# Patient Record
Sex: MALE | Race: BLACK OR AFRICAN AMERICAN
[De-identification: names, ages, dates, MRNs, and addresses within clinical notes are randomized per-mention and may not be internally consistent; named-entity substitution may affect disease eponyms.]

---

## 2022-07-08 ENCOUNTER — HOSPITAL ENCOUNTER (INPATIENT)
Dept: HOSPITAL 97 - ER | Age: 57
LOS: 2 days | Discharge: HOME | DRG: 302 | End: 2022-07-10
Attending: INTERNAL MEDICINE | Admitting: INTERNAL MEDICINE
Payer: COMMERCIAL

## 2022-07-08 VITALS — OXYGEN SATURATION: 99 %

## 2022-07-08 VITALS — BODY MASS INDEX: 35.3 KG/M2

## 2022-07-08 DIAGNOSIS — N17.9: ICD-10-CM

## 2022-07-08 DIAGNOSIS — U07.1: ICD-10-CM

## 2022-07-08 DIAGNOSIS — E87.5: ICD-10-CM

## 2022-07-08 DIAGNOSIS — E11.65: ICD-10-CM

## 2022-07-08 DIAGNOSIS — I50.9: ICD-10-CM

## 2022-07-08 DIAGNOSIS — I13.0: ICD-10-CM

## 2022-07-08 DIAGNOSIS — N40.0: ICD-10-CM

## 2022-07-08 DIAGNOSIS — E11.22: ICD-10-CM

## 2022-07-08 DIAGNOSIS — N18.4: ICD-10-CM

## 2022-07-08 DIAGNOSIS — I25.119: Primary | ICD-10-CM

## 2022-07-08 DIAGNOSIS — E78.5: ICD-10-CM

## 2022-07-08 LAB
BUN BLD-MCNC: 46 MG/DL (ref 7–18)
GLUCOSE SERPLBLD-MCNC: 132 MG/DL (ref 74–106)
HCT VFR BLD CALC: 35.8 % (ref 39.6–49)
LYMPHOCYTES # SPEC AUTO: 1.2 K/UL (ref 0.7–4.9)
MCV RBC: 90.7 FL (ref 80–100)
NT-PROBNP SERPL-MCNC: 83 PG/ML (ref ?–125)
PMV BLD: 8.5 FL (ref 7.6–11.3)
POTASSIUM SERPL-SCNC: 5.4 MMOL/L (ref 3.5–5.1)
RBC # BLD: 3.94 M/UL (ref 4.33–5.43)
TROPONIN I SERPL HS-MCNC: 25.6 PG/ML (ref ?–58.9)

## 2022-07-08 PROCEDURE — 76770 US EXAM ABDO BACK WALL COMP: CPT

## 2022-07-08 PROCEDURE — 81003 URINALYSIS AUTO W/O SCOPE: CPT

## 2022-07-08 PROCEDURE — 36415 COLL VENOUS BLD VENIPUNCTURE: CPT

## 2022-07-08 PROCEDURE — 80053 COMPREHEN METABOLIC PANEL: CPT

## 2022-07-08 PROCEDURE — 71045 X-RAY EXAM CHEST 1 VIEW: CPT

## 2022-07-08 PROCEDURE — 81015 MICROSCOPIC EXAM OF URINE: CPT

## 2022-07-08 PROCEDURE — 85025 COMPLETE CBC W/AUTO DIFF WBC: CPT

## 2022-07-08 PROCEDURE — 80048 BASIC METABOLIC PNL TOTAL CA: CPT

## 2022-07-08 PROCEDURE — 84484 ASSAY OF TROPONIN QUANT: CPT

## 2022-07-08 PROCEDURE — 76857 US EXAM PELVIC LIMITED: CPT

## 2022-07-08 PROCEDURE — 82947 ASSAY GLUCOSE BLOOD QUANT: CPT

## 2022-07-08 PROCEDURE — 99285 EMERGENCY DEPT VISIT HI MDM: CPT

## 2022-07-08 PROCEDURE — 83880 ASSAY OF NATRIURETIC PEPTIDE: CPT

## 2022-07-08 PROCEDURE — 82043 UR ALBUMIN QUANTITATIVE: CPT

## 2022-07-08 PROCEDURE — 85379 FIBRIN DEGRADATION QUANT: CPT

## 2022-07-08 PROCEDURE — 82570 ASSAY OF URINE CREATININE: CPT

## 2022-07-08 PROCEDURE — 93005 ELECTROCARDIOGRAM TRACING: CPT

## 2022-07-08 RX ADMIN — HUMAN INSULIN SCH: 100 INJECTION, SOLUTION SUBCUTANEOUS at 22:04

## 2022-07-08 RX ADMIN — Medication SCH ML: at 23:13

## 2022-07-08 RX ADMIN — HEPARIN SODIUM SCH UNIT: 5000 INJECTION, SOLUTION INTRAVENOUS; SUBCUTANEOUS at 23:08

## 2022-07-08 RX ADMIN — ATORVASTATIN CALCIUM SCH: 10 TABLET, FILM COATED ORAL at 22:04

## 2022-07-08 RX ADMIN — LABETALOL HYDROCHLORIDE SCH: 100 TABLET, FILM COATED ORAL at 22:04

## 2022-07-08 RX ADMIN — TAMSULOSIN HYDROCHLORIDE SCH: 0.4 CAPSULE ORAL at 22:04

## 2022-07-08 RX ADMIN — HYDRALAZINE HYDROCHLORIDE SCH: 25 TABLET, FILM COATED ORAL at 22:04

## 2022-07-08 NOTE — RAD REPORT
EXAM DESCRIPTION:  US - Urinary Bladder - 7/8/2022 9:17 pm

 

CLINICAL HISTORY:  Bladder outlet obstruction

 

FINDINGS:  Bladder volume 416 cc.

 

Prostate gland is moderately to markedly enlarged.

 

No ascites

 

IMPRESSION:  Moderate to marked enlargement of the prostate gland.

 

Bladder is distended

## 2022-07-08 NOTE — RAD REPORT
EXAM DESCRIPTION:  [place] Single View7/8/2022 4:28 pm

 

CLINICAL HISTORY:  Chest pain

 

COMPARISON:  2012

 

FINDINGS:   The lungs appear clear of acute infiltrate. The heart is normal size

 

IMPRESSION:   No acute abnormalities displayed

## 2022-07-08 NOTE — EDPHYS
Physician Documentation                                                                           

 [facility]                                                                 

Name: Gerry Liang                                                                                

Age: 57 yrs                                                                                       

Sex: Male                                                                                         

: 1965                                                                                   

MRN: [id number]                                                                                   

Arrival Date: 2022                                                                          

Time: 15:45                                                                                       

Account#: [id number]                                                                            

Bed 24                                                                                            

Private MD:                                                                                       

ED Physician Geovany Randle                                                                         

HPI:                                                                                              

                                                                                             

16:33 This 57 yrs old Black Male presents to ER via Ambulatory with complaints of Shortness   ms3 

      Of Breath - on exertion.                                                                    

16:34 The patient or guardian reports chest pain that is located primarily in the substernal  ms3 

      area. Onset: 1 week(s) ago. The pain does not radiate. Associated signs and symptoms:       

      Pertinent positives: Pertinent negatives: abdominal pain, diaphoresis, headache,            

      vomiting. The chest pain is described as "discomfort". Duration: The patient or             

      guardian reports a single episode. Modifying factors: The symptoms are alleviated by        

      rest, the symptoms are aggravated by exertion. Severity of pain: At its worst the pain      

      was incapacitating in the emergency department the pain has resolved.                       

                                                                                                  

Historical:                                                                                       

- Allergies:                                                                                      

16:06 unknow med;                                                                             jl7 

- PMHx:                                                                                           

16:06 Diabetes mellitus; Hypertensive disorder; Hypercholesterolemia; Congestive heart        jl7 

      failure;                                                                                    

                                                                                                  

- Immunization history:: Client reports receiving the 2nd dose of the Covid vaccine.              

- Social history:: Smoking status: Patient denies any tobacco usage or history of.                

                                                                                                  

                                                                                                  

ROS:                                                                                              

16:34 Constitutional: Negative for fever, and chills. Neck: Negative for injury, pain, and    ms3 

      swelling.                                                                                   

16:34 Respiratory: Negative for shortness of breath, cough, wheezing, and pleuritic chest         

      pain, Abdomen/GI: Negative for abdominal pain, nausea, vomiting, diarrhea, and              

      constipation, MS/Extremity: Negative for injury and deformity, Skin: Negative for           

      injury, rash, and discoloration, Neuro: Negative for headache, weakness, numbness,          

      tingling.                                                                                   

16:34 Cardiovascular: Positive for chest pain.                                                    

16:34 All other systems are negative.                                                             

                                                                                                  

Exam:                                                                                             

16:09 ECG was reviewed by the Attending Physician.                                            ms3 

16:34 Constitutional:  This is a well developed, well nourished patient who is awake, alert,  ms3 

      and in no acute distress. Head/Face:  Normocephalic, atraumatic. Neck:  Trachea             

      midline, no cervical lymphadenopathy.  Supple, full range of motion without nuchal          

      rigidity, or vertebral point tenderness.  No Meningismus. Chest/axilla:  Normal chest       

      wall appearance and motion.  Nontender with no deformity.   Cardiovascular:  Regular        

      rate and rhythm with a normal S1 and S2.  No gallops, murmurs, or rubs.  Normal PMI, no     

      JVD.  No pulse deficits. Respiratory:  Lungs have equal breath sounds bilaterally,          

      clear to auscultation and percussion.  No rales, rhonchi or wheezes noted.  No              

      increased work of breathing, no retractions or nasal flaring. Abdomen/GI:  Soft,            

      non-tender, with normal bowel sounds.  No distension or tympany.  No guarding or            

      rebound.  No evidence of tenderness throughout. Skin:  Warm, dry with normal turgor.        

      Normal color with no rashes, no lesions, and no evidence of cellulitis. MS/ Extremity:      

      Pulses equal, no cyanosis.  Neurovascular intact.  Full, normal range of motion. Neuro:     

       Awake and alert, GCS 15, oriented to person, place, time, and situation.  Cranial          

      nerves II-XII grossly intact.  Motor strength 5/5 in all extremities.  Sensory grossly      

      intact.  Cerebellar exam normal.  Normal gait. Psych:  Awake, alert, with orientation       

      to person, place and time.  Behavior, mood, and affect are within normal limits.            

                                                                                                  

Vital Signs:                                                                                      

16:05  / 74; Pulse 73; Resp 20; Temp 97.7; Pulse Ox 98% ; Weight 111.13 kg; Height 5    7 

      ft. 10 in. (177.80 cm); Pain 0/10;                                                          

16:21  / 89; Pulse 73; Resp 18; Pulse Ox 98% on R/A;                                    bh1 

17:46  / 78; Pulse 75; Resp 18; Pulse Ox 97% on R/A;                                    bh1 

20:02  / 61; Pulse 69; Resp 18; Pulse Ox 99% on R/A;                                    bh1 

16:05 Body Mass Index 35.15 (111.13 kg, 177.80 cm)                                            [facility] 

                                                                                                  

MDM:                                                                                              

16:03 Patient medically screened.                                                             ms3 

16:35 Differential diagnosis: abnormal EKG, acute myocardial infarction, stable angina,       ms3 

      unstable angina.                                                                            

17:13 HEART Score: History: Moderately Suspicious (1), ECG: Normal (0), Age: > 45 and < 65    ms3 

      years (1), Risk Factors: > or = 3 Risk factors for atherosclerotic disease (2),             

      [Hypercholesterolemia] [Hypertension] [DM] Troponin: < or = 1 x Normal Limit (0), Total     

      Score = 4. Data reviewed: vital signs, nurses notes, lab test result(s), EKG,               

      radiologic studies, and as a result, I will admit patient. Counseling: I had a detailed     

      discussion with the patient and/or guardian regarding: the historical points, exam          

      findings, and any diagnostic results supporting the discharge/admit diagnosis, lab          

      results, radiology results, the need for further work-up and treatment in the hospital.     

      ED course: Discussed plan for observation with patient and he agrees with plan. patient     

      remained in stable condition in the ED..                                                    

                                                                                                  

                                                                                             

16:04 Order name: Basic Metabolic Panel; Complete Time: 17:05                                 ms3 

                                                                                             

16:04 Order name: CBC with Diff; Complete Time: 17:05                                         ms3 

                                                                                             

16:04 Order name: NT PRO-BNP; Complete Time: 17:05                                            ms3 

                                                                                             

16:04 Order name: Troponin HS; Complete Time: 17:05                                           ms3 

                                                                                             

16:04 Order name: XRAY Chest (1 view); Complete Time: 17:05                                   ms3 

                                                                                             

16:33 Order name: COVID-19 SARS RT PCR (Document "Date of Onset" if Symptomatic)              jl7 

                                                                                             

16:04 Order name: EKG; Complete Time: 16:04                                                   ms3 

                                                                                             

16:04 Order name: Cardiac monitoring; Complete Time: 16:21                                    ms3 

                                                                                             

16:04 Order name: EKG - Nurse/Tech; Complete Time: 16:21                                      ms3 

                                                                                             

16:04 Order name: IV Saline Lock; Complete Time: 16:21                                        ms3 

                                                                                             

16:04 Order name: Labs collected and sent; Complete Time: 16:21                               ms3 

                                                                                             

16:04 Order name: O2 Per Protocol; Complete Time: 16:21                                       ms3 

                                                                                             

21:37 Order name:                                                                           EDMS

                                                                                             

16:04 Order name: O2 Sat Monitoring; Complete Time: 16:21                                     ms3 

                                                                                                  

EC:09 Rate is 74 beats/min. Rhythm is regular. Left axis deviation noted. MI interval is      ms3 

      normal. QRS interval is normal. Clinical impression: NSR w/ Non-specific ST/T Changes.      

      Interpreted by me. Reviewed by me.                                                          

                                                                                                  

Administered Medications:                                                                         

16:25 Drug: Aspirin Chewable Tablet 324 mg Route: PO;                                         [facility] 

16:25 Follow up: Response: No adverse reaction                                                [facility] 

                                                                                                  

                                                                                                  

Disposition Summary:                                                                              

22 17:12                                                                                    

Hospitalization Ordered                                                                           

      Location: Telemetry/MedSurg (observation)                                               ms3 

      Condition: Stable                                                                       ms3 

      Problem: new                                                                            ms3 

      Symptoms: are unchanged                                                                 ms3 

      Bed/Room Type: Standard                                                                 ms3 

      Hospitalization Status: Inpatient Admission(22 17:51)                             [facility] 

      Provider: Fercho Garnica(22 17:51)                                                 elisha 

      Room Assignment: [facility](22 21:02)                                                      

      Diagnosis                                                                                   

        - Chest pain, unspecified                                                             ms3 

      Forms:                                                                                      

        - Medication Reconciliation Form                                                      ms3 

        - SBAR form                                                                           ms3 

Signatures:                                                                                       

Dispatcher [employer]                           EDMS                                                 

Perez Guevara, AGUILA-C                      ANISHP-Cla1                                                  

Lucero Ngo RN                       RN                                                      

Andres George RN                        RN   jl7                                                  

Geovany Randle DO                        DO   ms3                                                  

Gina Clarke RN                      RN   [facility]                                                  

                                                                                                  

Corrections: (The following items were deleted from the chart)                                    

16:07 16:06 PMHx: None; akil barnard 

17:51 17:12 Observation ms3                                                                   la1 

17:51 17:12 Luis A Agustin ms3                                                              la1 

21:02 17:12 ms3                                                                               cg  

                                                                                                  

**************************************************************************************************

## 2022-07-08 NOTE — P.PN
Date of Service: 07/08/22





Brief Renal note (chart review only, full consult note to follow):


Pt sent from the office earlier today due to reports of worsening AGUILERA. Pt's 

renal function tests on admission reveal Stage III ARF (Cr > 4 mg/dl) on his 

underlying CKD IIIb/IV (Cr levels earlier in the year ranged between 2.5-2.9 

mg/dl). Pt previously taken off agents such as ARB and SGLT2i. Pt has been on 

chronic diuretic therapy (loop and aldosterone antagonist) for unspecified CHF 

and BNP here < 100 despite renal impairment and CXR clear so can not exclude a 

pre-renal component. 


Pt's symptoms appear to be possibly COVID-19 infection related as he has tested 

positive.


Pt also has some hx of bladder outlet obstruction so will check random bladder 

scan. Will dose Kayexalate 15 gm x 1 for mild hyperkalemia. Will suspend 

diuretics. Will hydrate gently.





Thank you for the referral,


Keshav Ibarra MD, [facility]


[facility]

## 2022-07-08 NOTE — ER
Nurse's Notes                                                                                     

 [facility]                                                                 

Name: Gerry Liang                                                                                

Age: 57 yrs                                                                                       

Sex: Male                                                                                         

: 1965                                                                                   

MRN: [id number]                                                                                   

Arrival Date: 2022                                                                          

Time: 15:45                                                                                       

Account#: [id number]                                                                            

Bed 24                                                                                            

Private MD:                                                                                       

Diagnosis: Chest pain, unspecified                                                                

                                                                                                  

Presentation:                                                                                     

                                                                                             

16:05 Chief complaint: Patient states: Chest discomfort with exertion x 1 week. Coronavirus   jl7 

      screen: At this time, the client does not indicate any symptoms associated with             

      coronavirus-19. Ebola Screen: No symptoms or risks identified at this time. Initial         

      Sepsis Screen: Does the patient meet any 2 criteria? No. Patient's initial sepsis           

      screen is negative. Does the patient have a suspected source of infection? No.              

      Patient's initial sepsis screen is negative. Risk Assessment: Do you want to hurt           

      yourself or someone else? Patient reports no desire to harm self or others. Onset of        

      symptoms was 2022.                                                                  

16:05 Method Of Arrival: Ambulatory                                                           jl7 

16:05 Acuity: DAMIAN 2                                                                           jl7 

                                                                                                  

Triage Assessment:                                                                                

16:06 General: Appears in no apparent distress. uncomfortable, Behavior is calm, cooperative, jl7 

      appropriate for age. Pain: Complains of pain in chest Pain currently is 0 out of 10 on      

      a pain scale. at worst was 10 out of 10 on a pain scale. Respiratory: Reports shortness     

      of breath on exertion Onset: The symptoms/episode began/occurred gradually, the patient     

      reports symptoms have resolved.                                                             

                                                                                                  

Historical:                                                                                       

- Allergies:                                                                                      

16:06 unknow med;                                                                             jl7 

- PMHx:                                                                                           

16:06 Diabetes mellitus; Hypertensive disorder; Hypercholesterolemia; Congestive heart        jl7 

      failure;                                                                                    

                                                                                                  

- Immunization history:: Client reports receiving the 2nd dose of the Covid vaccine.              

- Social history:: Smoking status: Patient denies any tobacco usage or history of.                

                                                                                                  

                                                                                                  

Screenin:21 Abuse screen: Denies threats or abuse. Nutritional screening: No deficits noted.        bh1 

      Tuberculosis screening: No symptoms or risk factors identified. Fall Risk None              

      identified.                                                                                 

                                                                                                  

Assessment:                                                                                       

16:21 Reassessment: No changes from previously documented assessment. General: Appears in no  bh1 

      apparent distress. Cardiovascular: No deficits noted. Rhythm is regular. Respiratory:       

      Airway is patent Respiratory effort is even, unlabored, Breath sounds are clear             

      bilaterally.                                                                                

                                                                                                  

Vital Signs:                                                                                      

16:05  / 74; Pulse 73; Resp 20; Temp 97.7; Pulse Ox 98% ; Weight 111.13 kg; Height 5    7 

      ft. 10 in. (177.80 cm); Pain 0/10;                                                          

16:21  / 89; Pulse 73; Resp 18; Pulse Ox 98% on R/A;                                    bh1 

17:46  / 78; Pulse 75; Resp 18; Pulse Ox 97% on R/A;                                    bh1 

20:02  / 61; Pulse 69; Resp 18; Pulse Ox 99% on R/A;                                    bh1 

16:05 Body Mass Index 35.15 (111.13 kg, 177.80 cm)                                            7 

                                                                                                  

ED Course:                                                                                        

15:45 Patient arrived in ED.                                                                  as  

15:46 Geovany Randle DO is Attending Physician.                                                ms3 

16:02 Gina Clarke, RN is Primary Nurse.                                                    1 

16:06 Triage completed.                                                                       7 

16:06 Arm band placed on right wrist.                                                         jl7 

16:21 No apparent distress. Awaiting lab results, Awaiting radiology results.                 1 

16:21 Patient has correct armband on for positive identification. Bed in low position. Call   [facility] 

      light in reach. Side rails up X 1. Cardiac monitor on. Pulse ox on. NIBP on.                

16:21 Basic Metabolic Panel Sent.                                                             [facility] 

16:21 CBC with Diff Sent.                                                                     1 

16:21 NT PRO-BNP Sent.                                                                        1 

16:21 Troponin HS Sent.                                                                       1 

16:21 No provider procedures requiring assistance completed. Inserted saline lock: 20 gauge   bh1 

      in left forearm, using aseptic technique. Blood collected.                                  

16:30 XRAY Chest (1 view) In Process Unspecified.                                             [facility]

17:11 Luis A Agustin MD is Hospitalizing Provider.                                         ms3 

17:46 No apparent distress. Resting quietly. Awaiting bed assignment, Awaiting lab results,   [facility] 

      Awaiting radiology results. Patient requests food.                                          

17:46 COVID-19 SARS RT PCR (Document "Date of Onset" if Symptomatic) Sent.                    [facility] 

17:51 Fercho Garnica MD is Hospitalizing Provider.                                            la1 

20:04 No apparent distress. Resting quietly. Awaiting bed assignment.                         [facility] 

21:16 Patient admitted, IV remains in place.                                                  1 

                                                                                                  

Administered Medications:                                                                         

16:25 Drug: Aspirin Chewable Tablet 324 mg Route: PO;                                         [facility] 

16:25 Follow up: Response: No adverse reaction                                                [facility] 

                                                                                                  

                                                                                                  

Medication:                                                                                       

16:21 VIS not applicable for this client.                                                     [facility] 

                                                                                                  

Outcome:                                                                                          

17:12 Decision to Hospitalize by Provider.                                                    ms3 

21:15 Admitted to Tele accompanied by tech, room 427, Report called to  GUERA LICONA               [facility] 

21:15 Condition: good                                                                             

21:15 Discharge instructions given to caretaker.                                                  

22:24 Patient left the ED.                                                                    [facility] 

                                                                                                  

Signatures:                                                                                       

Dispatcher [employer]                           EDSisi Zapata Lee, FNP-C                      FNP-Cla1                                                  

Andres George RN                        RN   jl7                                                  

Geovany Randle DO                        DO   ms3                                                  

Gina Clarke RN                      RN   [facility]                                                  

                                                                                                  

Corrections: (The following items were deleted from the chart)                                    

16:07 16:06 PMHx: None; akil barnard 

21:41 21:15 Admitted to Tele accompanied by tech, room 427, [place] [id number] 

                                                                                                  

**************************************************************************************************

## 2022-07-08 NOTE — P.HP
Certification for Inpatient


Patient admitted to: Inpatient


With expected LOS: >2 Midnights


Patient will require the following post-hospital care: None


Practitioner: I am a practitioner with admitting privileges, knowledge of 

patient current condition, hospital course, and medical plan of care.


Services: Services provided to patient in accordance with Admission requirements

found in Title 42 Section 412.3 of the Code of Federal Regulations





<Perez Guevara - Last Filed: 22 18:41>





Patient History


Date of Service: 22


Reason for admission: Chest pain


History of Present Illness: 


57-year-old male with history of insulin-dependent diabetes, hypertension, 

hyperlipidemia, CHFunknown EF, CAD presents to the emergency department for 

chest pain.  Patient reports that he was having to move and heavy object which 

is atypical for him in the process of moving heavy objects he developed an 

uncomfortable sensation in his chest with severe fatigue causing him to need to 

stop this activity, he rested and it was relieved, he attempted again to move a 

heavy object and once again experienced an abnormal sensation in his chest which

she cannot further describe along with fatigue.  He discontinued his efforts and

presented to the emergency department for evaluation his labs were remarkable 

for creatinine of 4.03 GFR of 16 BUN of 46 and potassium of 5.4 his initial 

high-sensitivity troponin was within normal limits at 25.6, his BNP was 83 EKG 

was without STEMI criteria chest x-ray showed no acute abnormalities.  His last 

creatinine here was from  and was normal at 1.27 although patient reports he

does follow with nephrology on outpatient basis and has been told he has chronic

kidney disease, he cannot tell me what his baseline renal function is although 

he reports that in the past his nephrologist has told him of his kidney function

got much worse he could require dialysis.  I called patient's nephrology group, 

they will consult.  ED provider wishes to admit for ACS rule out.








- Past Medical/Surgical History


-: Diabetes metas type IIinsulin-dependent


-: Hypertension


-: BPH


-: Hyperlipidemia


-: CKD


-: CHFunknown EF


-: Right foot surgery


-: Heart cath around  no stentssome disease


Psychosocial/ Personal History: Patient lives at home, is retired





- Family History


  ** Mother


-: Heart disease


Notes:  at 69 from [place]





  ** Brother


-: Heart disease


Notes:  at 56 from [place]





- Social History


Smoking Status: Never smoker


Alcohol use: No


CD- Drugs: No


Caffeine use: Yes


Place of Residence: Home





<Perez Guevara - Last Filed: 22 18:41>


Date of Service: 22





<Fercho Garnica - Last Filed: 22 16:27>





Review of Systems


10-point ROS is otherwise unremarkable


Respiratory: SOB with Excertion


Cardiovascular: Chest Pain





<Perez Guevara - Last Filed: 22 18:41>





Physical Examination





- Physical Exam


General: Alert, In no apparent distress, Oriented x3, Obese


HEENT: Atraumatic, PERRLA, Mucous membr. moist/pink, EOMI, Sclerae nonicteric


Neck: Supple, 2+ carotid pulse no bruit, No LAD, Without JVD or thyroid 

abnormality


Respiratory: Clear to auscultation bilaterally, Normal air movement


Cardiovascular: Regular rate/rhythm, Normal S1 S2


Gastrointestinal: Normal bowel sounds, No tenderness


Musculoskeletal: No tenderness


Integumentary: No rashes


Neurological: Normal speech, Normal strength at 5/5 x4 extr, Normal tone, Normal

affect





- Studies


Laboratory Data (last 24 hrs)





22 16:18: WBC 6.2, Hgb 12.0 L, Hct 35.8 L, Plt Count 236


22 16:18: Sodium 136, Potassium 5.4 H, BUN 46 H, Creatinine 4.03 H, 

Glucose 132 H








<Perez Guevara - Last Filed: 22 18:41>





- Studies


Laboratory Data (last 24 hrs)





22 16:18: WBC 6.2, Hgb 12.0 L, Hct 35.8 L, Plt Count 236


22 16:18: Sodium 136, Potassium 5.4 H, BUN 46 H, Creatinine 4.03 H, 

Glucose 132 H








<Fercho Garinca - Last Filed: 22 16:27>





Assessment and Plan





- Plan


Assessment:


Chest pain rule out ACS


Acute renal failure versus CKD 4


Chronic CHFunknown EF


Hypertension


Hyperlipidemia


BPH





Plan:


Chest pain rule out ACS: Patient reports last heart catheterization was around 5

years ago did not require any stenting but was told he did have some coronary 

artery disease, continue patient's aspirin, statin we will add low-dose beta-

blocker.  Story sounds like new onset angina, symptoms were quickly relieved 

after discontinuation of strenuous activity.  Monitor on telemetry, trend 

troponins, cardiology consult in place.  Continue other home medications.


Acute renal failure versus CKD 4: Unknown baseline renal function, patient 

follows with Dr. Plummer, has consulted nephrology for additional input, 

patient may be in acute renal failure renal ultrasound has been ordered and 

nephrology consult is in place.  Patient on renal/ADA diet.


Chronic CHFunknown EF: No echo available for review patient reports that he had

echocardiogram done in the last year or 2 with his cardiologist Dr. Mendenhall.  He is 

not sure what his ejection fraction is he does not appear to be volume 

overloaded at this time.  We will continue patient's home medications and 

monitor on telemetry.  Appreciate further input from cardiology.


Hypertension: Home medications have been continued with hold parameters with a

ddition of low-dose beta-blocker


Hyperlipidemia: Continue statin


BPH: Flomax continued.





DVT PPX: Heparin


Code status: Full


Discharge Plan: Home


Plan to discharge in: 48 Hours





- Advance Directives


Does patient have a Living Will: No


Does patient have a Durable POA for Healthcare: No





- Code Status/Comfort Care


Code Status Assessed: Yes (Full code)


Critical Care: No


Time Spent Managing Pts Care (In Minutes): 70





<Perez Guevara - Last Filed: 22 18:41>


Physician Review Additional Text: 


I have personally seen and evaluated Mr. Gerry Liang. I reviewed the notes and 

assessments performed by Perez Guevara NP. I independently performed my own 

history and physical examination. I agree with the assessment and plan as 

outlined in his note. I concur with his documentation of Mr. Liang.





<Fercho Garnica - Last Filed: 22 16:27>

## 2022-07-09 LAB
ALBUMIN SERPL BCP-MCNC: 3.7 G/DL (ref 3.4–5)
ALP SERPL-CCNC: 55 U/L (ref 45–117)
ALT SERPL W P-5'-P-CCNC: 61 U/L (ref 12–78)
AST SERPL W P-5'-P-CCNC: 47 U/L (ref 15–37)
BUN BLD-MCNC: 50 MG/DL (ref 7–18)
CREAT UR-SCNC: 97 MG/DL (ref 20–370)
GLUCOSE SERPLBLD-MCNC: 177 MG/DL (ref 74–106)
HCT VFR BLD CALC: 34.1 % (ref 39.6–49)
LYMPHOCYTES # SPEC AUTO: 1.7 K/UL (ref 0.7–4.9)
MCV RBC: 90.5 FL (ref 80–100)
MICROALBUMIN UR-MCNC: < 0.5 MG/DL (ref ?–1.9)
PMV BLD: 9.3 FL (ref 7.6–11.3)
POTASSIUM SERPL-SCNC: 4.4 MMOL/L (ref 3.5–5.1)
RBC # BLD: 3.77 M/UL (ref 4.33–5.43)

## 2022-07-09 RX ADMIN — Medication SCH ML: at 09:00

## 2022-07-09 RX ADMIN — HYDRALAZINE HYDROCHLORIDE SCH: 25 TABLET, FILM COATED ORAL at 13:00

## 2022-07-09 RX ADMIN — LABETALOL HYDROCHLORIDE SCH MG: 100 TABLET, FILM COATED ORAL at 21:19

## 2022-07-09 RX ADMIN — INSULIN GLARGINE SCH UNIT: 100 INJECTION, SOLUTION SUBCUTANEOUS at 10:04

## 2022-07-09 RX ADMIN — HUMAN INSULIN SCH UNIT: 100 INJECTION, SOLUTION SUBCUTANEOUS at 17:50

## 2022-07-09 RX ADMIN — ATORVASTATIN CALCIUM SCH MG: 10 TABLET, FILM COATED ORAL at 21:19

## 2022-07-09 RX ADMIN — HYDRALAZINE HYDROCHLORIDE SCH: 25 TABLET, FILM COATED ORAL at 10:06

## 2022-07-09 RX ADMIN — Medication SCH ML: at 21:20

## 2022-07-09 RX ADMIN — ASPIRIN SCH MG: 81 TABLET, COATED ORAL at 10:06

## 2022-07-09 RX ADMIN — HEPARIN SODIUM SCH: 5000 INJECTION, SOLUTION INTRAVENOUS; SUBCUTANEOUS at 10:07

## 2022-07-09 RX ADMIN — HYDRALAZINE HYDROCHLORIDE SCH MG: 25 TABLET, FILM COATED ORAL at 21:19

## 2022-07-09 RX ADMIN — TAMSULOSIN HYDROCHLORIDE SCH MG: 0.4 CAPSULE ORAL at 21:19

## 2022-07-09 RX ADMIN — HUMAN INSULIN SCH UNIT: 100 INJECTION, SOLUTION SUBCUTANEOUS at 12:38

## 2022-07-09 RX ADMIN — HUMAN INSULIN SCH: 100 INJECTION, SOLUTION SUBCUTANEOUS at 07:30

## 2022-07-09 RX ADMIN — HEPARIN SODIUM SCH UNIT: 5000 INJECTION, SOLUTION INTRAVENOUS; SUBCUTANEOUS at 22:11

## 2022-07-09 RX ADMIN — HUMAN INSULIN SCH UNIT: 100 INJECTION, SOLUTION SUBCUTANEOUS at 22:22

## 2022-07-09 RX ADMIN — LABETALOL HYDROCHLORIDE SCH: 100 TABLET, FILM COATED ORAL at 10:06

## 2022-07-09 NOTE — P.CNS
Date of Consult: 22


Reason for Consult: Renal insufficiency


Primary Care Provider: Perez


Chief Complaint: Chest pain


History of Present Illness: 





Pt is a 57-year-old male AAM with history of uncontrolled insulin-dependent 

diabetes, hypertension, hyperlipidemia, CHF who was referred to the ER by the NP

working with Dr. Plummer his Nephrologist based on pt reports of increased 

dyspnea and fatigue with exertion. He denies current CP at rest. He has not been

out of bed today. He denies dyspnea at rest and is not on O2. He is afebrile, he

did test positive for COVID-19. His renal function tests on admission were worse

c/w his baseline function. He has been urinating without issues he reports. 

Renal u/s done which was reviewed.


Allergies





No Known Allergies Allergy (Unverified 22 22:01)


   





Home Medications: 








Amlodipine [Norvasc*] 5 mg PO DAILY 22 


Aspirin [Aspirin EC 81 MG] 81 mg PO DAILY 22 


Bumetanide [Bumex*] 1 mg PO DAILY 22 


Doxazosin Mesylate [Cardura] 2 mg PO BEDTIME 22 


Famotidine 20 mg PO BID 22 


Hydralazine [Apresoline*] 25 mg PO TID 22 


Labetalol HCl [Trandate] 200 mg PO BID 22 


Simvastatin 20 mg PO BEDTIME 22 


Spironolactone [Aldactone] 50 mg PO BID 22 


Tadalafil [Cialis] 5 mg PO DAILY 22 


Tamsulosin [Flomax*] 1 mg PO DAILY 22 








- Past Medical/Surgical History


Diabetic: Yes


-: Diabetes metas type IIinsulin-dependent


-: Hypertension


-: BPH


-: Hyperlipidemia


-: CKD


-: CHFunknown EF


-: Right foot surgery from infection


-: Heart cath around  no stentssome disease


Psychosocial/ Personal History: Patient lives at home, is retired





- Family History


  ** Mother


Medical History: Heart disease


Notes:  at 69 from [place]





  ** Brother


Medical History: Heart disease


Notes:  at 56 from [place]





- Social History


Alcohol use: No


CD- Drugs: No


Caffeine use: No


Place of Residence: Home





Review of Systems


General: Weakness


Eyes: Unremarkable


ENT: Unremarkable


Respiratory: SOB with Excertion


Cardiovascular: Chest Pain


Gastrointestinal: Unremarkable


Genitourinary: Other (Hx of BPH)


Musculoskeletal: Unremarkable


Integumentary: Unremarkable


Neurological: Unremarkable


Lymphatics: Unremarkable





Physical Examination














Temp Pulse Resp BP Pulse Ox


 


 97.6 F   71   18   108/71   98 


 


 22 16:27  22 16:27  22 16:27  22 16:27  22 16:27








General: Alert, In no apparent distress, Oriented x3


HEENT: Atraumatic


Neck: Supple


Respiratory: Normal air movement


Cardiovascular: No edema, Regular rate/rhythm


Gastrointestinal: Soft and benign


Integumentary: No rashes


Neurological: Normal speech, Normal affect (Limited exam due to COVID-19 

isolation precautions)


Laboratory Data (last 24 hrs)





22 16:18: WBC 6.2, Hgb 12.0 L, Hct 35.8 L, Plt Count 236


22 16:18: Sodium 136, Potassium 5.4 H, BUN 46 H, Creatinine 4.03 H, 

Glucose 132 H





Conclusions/Impression: 


1. Stage III ARF per AKIN definition


 2. Underlying CKD Stage IIIb/IV


 3. Azotemia


 4. Acute COVID-19 infection


 5. Dyspnea unspecified


 6. Chronic systolic CHF


 7. Type II DM with diabetic CKD


 8. Hyperkalemia, resolved





-Stage III ARF (Cr > 4 mg/dl) on his underlying CKD IIIb/IV (Cr levels earlier 

in the year ranged between 2.5-2.9 mg/dl). Pt previously taken off agents such 

as ARB and SGLT2i. Pt has been on chronic diuretic therapy (loop and aldosterone

 antagonist) for unspecified CHF and BNP here < 100 despite renal impairment and

 CXR clear so suspected a pre-renal component +/- functional GUERO from other 

effects (relative hypotension, other). Held off on IVF administration but 

suspended diuretics temp, Cr level marginally lower today, cont to trend. UOP 

not documented by staff.


-No uremic symptoms, trend azotemia which may worsen on steroids


-Place holding parameters on BP meds, no proven advantage to lowering SBP < 120 

mmHg in a diabetic and diabetic pts with CKD may be more susceptible to 

normotensive renal ischemia.


-Some of pt's symptoms appear to be possibly COVID-19 infection related as he 

has tested positive.


-Pt also has some hx of bladder outlet obstruction but renal u/s ruled out hydro

 or retention of urine, cont alpha blocker therapy.


-Hyperkalemia resolved





Thank you for the referral,


Keshav Ibarra MD, [facility]


[facility]

## 2022-07-09 NOTE — CON
Date of Consultation:  07/09/2022



Reason For Consultation:  Chest pain.



History Of Present Illness:  57-year-old male with history of diabetes, hypertension, dyslipidemia, c
ongestive heart failure, coronary artery disease, presented with chest discomfort and shortness of br
eath.  He said he was working and could not push himself anymore and he did not have mathieu pain or fr
ank difficulty breathing, but he just felt so exhausted and could not move any further.  Denies farzad hawkins any chest pain at the present time.  No shortness of breath.  Resting comfortably.  He was tested p
ositive for COVID.



Past Medical History:  As outlined above in the HPI.



Medications:  Refer reconciliation sheet for detailed list.



Allergies:  NO KNOWN DRUG ALLERGIES.



Family History:  No premature coronary artery disease or cancer.



Social History:  He does not smoke or drink.  Does not use any drugs.



Review of Systems:

All systems reviewed and they were negative except as mentioned in HPI.



Physical Examination:

Vital Signs:  Temperature is 97.6, pulse 71, breathing at 18, blood pressure is 108/71, saturating 98
% on room air. Pleasant, middle-aged male, in no apparent distress. Head And Neck:  Pupils are equal,
 reactive to light.  Intact eye movements.  No JVD.  No cervical lymphadenopathy. 

Neck:  Supple.  Thyroid is not enlarged. 

Lungs: Clear to auscultation bilaterally. No rhonchi, rales, or crackles.  No accessory muscle use. 

Heart: Regular rate and rhythm.  No extra sounds. 

Abdomen:  Soft, nontender.  Bowel sounds positive.  No organomegaly.  No masses or hernia.  No rigidi
ty or rebound. 

Extremities:  No edema, clubbing, cyanosis.  Intact pulses. 

Skin:  No rashes. 

Neurologic:  Alert, awake, oriented x3.  No acute focal deficits appreciated.



Investigations:  Creatinine 3.79, down from 4.03.  Hemoglobin 11.8, white blood cell count 6.1, and h
is troponin x2 were negative.



Assessment And Recommendations:  

1.Chest pain.  The atypical cardiac enzymes are negative from Cardiology standpoint.  We will plan f
or outpatient stress test and an echocardiogram.  No inpatient workup is needed.

2.Chronic kidney disease, being managed by Nephrology.

3.COVID positive.  The patient is asymptomatic.

4.Congestive heart failure.  He appears to be euvolemic at the present time.  No adjustment of medic
ations.  If the patient is otherwise stable, he can be released and we will follow up with him as an 
outpatient with exercise stress test and an echo.





/SHAYLA

DD:  07/09/2022 15:59:16Voice ID:  [id number]

DT:  07/09/2022 20:20:24Report ID:  [id number]

## 2022-07-09 NOTE — P.PN
Subjective


Date of Service: 07/09/22


Chief Complaint: Chest pain


No acute events since admission. This morning, he is sitting upright comfortably

in bed. He is breathing comfortably on room air. He denies any particular 

symptoms. He endorses that he has kidney disease, but he does not know the 

severity of it. Per review of the Nephrology progress note, it appears that his 

baseline creatinine is 2.52.9.





Review of Systems


General: Unremarkable


Eyes: Unremarkable


ENT: Unremarkable


Respiratory: SOB with Excertion


Cardiovascular: Chest Pain (with exertion)


Gastrointestinal: Unremarkable


Genitourinary: Unremarkable


Musculoskeletal: Unremarkable


Integumentary: Unremarkable


Neurological: Unremarkable





Physical Examination





- Vital Signs


Temperature: 97.6 F


Blood Pressure: 108/71


Pulse: 71


Respirations: 18


Pulse Ox (%): 98





- Physical Exam


General: Alert, In no apparent distress, Oriented x3


HEENT: Atraumatic, Mucous membr. moist/pink, EOMI


Neck: Supple, Without JVD or thyroid abnormality


Respiratory: Clear to auscultation bilaterally, Normal air movement


Cardiovascular: No edema, Regular rate/rhythm, Normal S1 S2, No gallops, No 

rubs, No murmurs


Gastrointestinal: Normal bowel sounds, Soft and benign, No tenderness, No 

rebound, No guarding


Musculoskeletal: No clubbing, No swelling


Integumentary: No rashes


Neurological: Normal speech, Normal affect





- Studies


Laboratory Data (last 24 hrs)





07/08/22 16:18: WBC 6.2, Hgb 12.0 L, Hct 35.8 L, Plt Count 236


07/08/22 16:18: Sodium 136, Potassium 5.4 H, BUN 46 H, Creatinine 4.03 H, 

Glucose 132 H





Medications List Reviewed: Yes





Assessment And Plan





- Plan


# Exertional Chest Pain - concern for Stable Angina


Based on history and physical examination, cannot exclude ischemia as a possible

etiology of his chest pain. Other differential diagnoses include, but are not 

limited to, pulmonary embolism, pericarditis, myocarditis, gastroesophageal 

reflux disease, gastritis/esophagitis, esophageal spasms, pleuritic chest pain, 

and musculoskeletal chest pain (i.e. costochondritis).


- Evaluation thus far: 


   - EKG: unavailable for my review - reportedly without STEMI criteria, trend 


   - Serial troponin: 25.6 -> 25.6


   - Ordered transthoracic echocardiogram 


   - Chest x-ray = "no acute abnormalities displayed."


   - Ordered d-dimer


- Management plan: 


   - Consult Cardiology and Dr. Lee notified - recommendations appreciated 


      - He will arrange outpatient follow-up for stress test


   - S/P aspirin 324 mg PO x 1 in ED 


   - Continue aspirin, atorvastatin, labetalol  


   - Symptom control with PRN acetaminophen, nitroglycerin, morphine 


   - Not on ACE-inhibitor/ARB given CKD





# KDIGO Stage III Acute Kidney Injury on Chronic Kidney Disease Stage IIIb/IV


- Nephrology consulted and Dr. Ibarra notified - recommendations appreciated


- Creatinine = 4.03 -> 3.79 (baseline creatinine 2.5-2.9 per Nephrology) 


- Urinalysis = within normal limits 


- US bladder = "moderate to marked enlargement of the prostate gland. Bladder is

distended."


-  Renal ultrasound = "1. Moderate increased bilateral renal parenchymal 

echotexture suggestive of renal medical disease. 2. Prostatomegaly impressing 

upon the base of the urinary bladder. 3. Mild urinary bladder wall thickening 

versus artifactual due to nondistended status."


   - Requested Garrett catheter be placed


- Monitor creatinine and urine output 


- Renally dose medications





# Hyperglycemia in Type II Diabetes Mellitus


- Continue home insulin regimen


- Correction scale insulin ordered





#COVID-19 positive


On exam, he appears clinically well and is not in any respiratory distress. His 

chest x-ray is also reassuring. He states that he would like some medication 

directed to COVID-19 infection. He specifically asks for remdesivir; however, I 

advised against this given his renal function. I offered that if he wants a 

medication for his COVID-19, we can treat him with a 5-day course of 

dexamethasone, but this will cause issues with his glycemic control. He states 

that he would like to proceed with dexamethasone treatment so this has been 

ordered.


- Monitor respiratory status


- Dexamethasone 6 mg PO daily x 5 days


- Incentive spirometry


- Isolation precautions





# Chronic Compensated Congestive Heart Failure (Unknown Ejection Fraction)


# Hypertension


# Hyperlipidemia


No evidence of acute CHF exacerbation.


- Consult Cardiology - recommendations appreciated 


- Ordered transthoracic echocardiogram 


- Continue home amlodipine, hydrlazine, labetalol


- Daily weights 


- Strict I/O 


- Cardiac diet, 2 L fluid restriction, 2 g Na restriction





Benign Prostatic Hyperplasia


- Continue home tamsulosin





Fercho Garnica MD

## 2022-07-09 NOTE — RAD REPORT
EXAM DESCRIPTION:  US - Renal Ultrasound-Complete - 7/8/2022 11:36 pm

 

CLINICAL HISTORY:  57 years Male, arf

 

TECHNIQUE:  Real-time transabdominal imaging of the kidneys was performed.

 

COMPARISON:  None were provided at time of this interpretation.

 

FINDINGS:  KIDNEYS: The right and left renal lengths are 11.1 cm and 9.5 cm, respectively. There is m
oderate increased bilateral renal parenchymal echogenicity. There is no hydronephrosis bilaterally.

Right midpole 0.8 cm simple renal cyst, no further workup is warranted No shadowing calculi bilateral
ly. No focal solid mass.

URINARY BLADDER: Prostatomegaly impressing upon the base of the urinary bladder. Mild urinary bladder
 wall thickening versus artifactual due to nondistended status.

 

IMPRESSION:  1.   Moderate increased bilateral renal parenchymal echotexture suggestive of renal medi
shayne disease.

2.   Prostatomegaly impressing upon the base of the urinary bladder.

3.   Mild urinary bladder wall thickening versus artifactual due to nondistended status.

 

Electronically signed by:   Blake Padilla MD   7/9/2022 12:25 AM CDT Workstation: [id number]

 

 

 

Due to temporary technical issues with the PACS/Fluency reporting system, reports are being signed by
 the in house radiologists without review as a courtesy to insure prompt reporting. The interpreting 
radiologist is fully responsible for the content of the report.

## 2022-07-10 VITALS — DIASTOLIC BLOOD PRESSURE: 72 MMHG | TEMPERATURE: 97.8 F | SYSTOLIC BLOOD PRESSURE: 151 MMHG

## 2022-07-10 LAB
ALBUMIN SERPL BCP-MCNC: 3.5 G/DL (ref 3.4–5)
ALP SERPL-CCNC: 57 U/L (ref 45–117)
ALT SERPL W P-5'-P-CCNC: 51 U/L (ref 12–78)
AST SERPL W P-5'-P-CCNC: 29 U/L (ref 15–37)
BUN BLD-MCNC: 50 MG/DL (ref 7–18)
GLUCOSE SERPLBLD-MCNC: 290 MG/DL (ref 74–106)
HCT VFR BLD CALC: 33.6 % (ref 39.6–49)
LYMPHOCYTES # SPEC AUTO: 0.7 K/UL (ref 0.7–4.9)
MCV RBC: 91.5 FL (ref 80–100)
PMV BLD: 9.3 FL (ref 7.6–11.3)
POTASSIUM SERPL-SCNC: 5.1 MMOL/L (ref 3.5–5.1)
RBC # BLD: 3.67 M/UL (ref 4.33–5.43)

## 2022-07-10 RX ADMIN — HUMAN INSULIN SCH UNIT: 100 INJECTION, SOLUTION SUBCUTANEOUS at 10:01

## 2022-07-10 RX ADMIN — HEPARIN SODIUM SCH: 5000 INJECTION, SOLUTION INTRAVENOUS; SUBCUTANEOUS at 09:00

## 2022-07-10 RX ADMIN — ASPIRIN SCH MG: 81 TABLET, COATED ORAL at 10:04

## 2022-07-10 RX ADMIN — Medication SCH ML: at 10:06

## 2022-07-10 RX ADMIN — HUMAN INSULIN SCH UNIT: 100 INJECTION, SOLUTION SUBCUTANEOUS at 13:13

## 2022-07-10 RX ADMIN — INSULIN GLARGINE SCH UNIT: 100 INJECTION, SOLUTION SUBCUTANEOUS at 10:02

## 2022-07-10 RX ADMIN — LABETALOL HYDROCHLORIDE SCH MG: 100 TABLET, FILM COATED ORAL at 10:07

## 2022-07-10 RX ADMIN — HYDRALAZINE HYDROCHLORIDE SCH MG: 25 TABLET, FILM COATED ORAL at 13:14

## 2022-07-10 RX ADMIN — HYDRALAZINE HYDROCHLORIDE SCH MG: 25 TABLET, FILM COATED ORAL at 10:05

## 2022-07-10 RX ADMIN — HUMAN INSULIN SCH UNIT: 100 INJECTION, SOLUTION SUBCUTANEOUS at 16:28

## 2022-07-10 NOTE — PN
Date of Progress Note:  07/10/2022



Subjective:  Seen by bedside.  Doing clinically well.  Has no symptoms.



Review of Systems:

No chest pain, shortness of breath, orthopnea, cough, nausea, vomiting, diarrhea, abdominal pain.  No
 dysuria, polyuria, or urinary urgency.  No skin rash.  All other systems reviewed and they are negat
mile.



Physical Examination:

Vital Signs:  Reviewed. 

Head and Neck:  Pupils are equal, reactive to light.  Intact eye movements.  No JVD.  No cervical lym
phadenopathy.  Neck is supple.  Thyroid is not enlarged. 

Lungs:  Clear to auscultation bilaterally.  No rhonchi, wheezing, or crackles.  No accessory muscle u
se. 

Heart:  Regular rate and rhythm.  No extra sounds. 

Abdomen:  Soft, nontender.  Bowel sounds positive.  No organomegaly.  No masses or hernia.  No rigidi
ty or rebound. 

Extremities:  No edema, clubbing, or cyanosis.  Intact pulses. 

Skin:  No rash. 

Neurologic:  Alert, awake, oriented x3.  No acute focal deficits appreciated. 

Lymph Nodes:  No cervical or axillary lymphadenopathy.



Investigations:  Labs were reviewed.



Assessment And Plan:  

1.Chest pain.  Cardiac enzymes were negative.  This patient can be released from our standpoint and 
plan for outpatient stress test and echocardiogram.

2.COVID positive, asymptomatic and stable.

3.Acute on chronic kidney disease.  Creatinine is improving.

4.Congestive heart failure.  He is euvolemic.  Adjustment of medications recommended at this point.





/SHAYLA

DD:  07/10/2022 17:33:08Voice ID:  [id number]

DT:  07/10/2022 20:32:45Report ID:  [id number]

## 2022-07-10 NOTE — P.DS
Admission Date: 07/08/22


Discharge Date: 07/10/22


Primary Care Provider: Perez


Disposition: ROUTINE DISCHARGE


Discharge Condition: GOOD


Reason for Admission: Chest pain


Consultations: 


1. Nephrology


2. Cardiology


Hospital Course: 





DIAGNOSES:


# Exertional Chest Pain - concern for Stable Angina


# KDIGO Stage III Acute Kidney Injury on Chronic Kidney Disease Stage IIIb/IV


# Hyperglycemia in Type II Diabetes Mellitus


#COVID-19 positive


# Chronic Compensated Congestive Heart Failure (Unknown Ejection Fraction)


# Hypertension


# Hyperlipidemia


# Benign Prostatic Hyperplasia








HOSPITAL COURSE:


Mr. Gerry Liang is a pleasant 57 year old male with a past medical history 

significant for chronic kidney disease stage IIIb/IV, type II diabetes mellitus,

chronic congestive heart failure (unknown ejection fraction), hypertension, 

hyperlipidemia, and benign prostatic hyperplasia who was admitted to the [facility] on 07/08/2022 for chest pain.





Upon further evaluation, his vital signs were stable. His EKG was without STEMI 

criteria. His troponin trend was 25.6 and 25.6, respectively. Chest x-ray 

revealed, "no acute abnormalities displayed." His d-dimer was 434. Cardiology 

was consulted and Dr. Lee evaluated him. He deemed him stable for discharge 

from a cardiology standpoint and recommended that he follow-up in clinic in 1-2 

weeks for an outpatient stress test. In regards to his acute kidney injury, this

corrected over his hospital course by holding his diuretics. Nephrology was 

consulted and he was evaluated by Dr. Ibarra, who has cleared him for discharge 

with the following recommendations: (1) reduce home spironolactone dose to 25 mg

daily, (2) reduce home bumetanide dose to 0.5 mg daily, and (3) schedule a 

follow-up appointment with Dr. Lopez in 7-10 days.





Of note, he was incidentally noted to have a COVID-19. He did not display 

hypoxia or many respiratory symptoms in the hospital, but he was empirically 

started on dexamethasone due to exertional shortness of breath. However, with 

dexamethasone his glycemic control was quite poor. A risks/benefits discussion 

was held with him, and he decided to proceed without any more dexamethasone. I 

reviewed his case with our Infectious Diseases specialist, particularly 

regarding remdesivir and Paxlovid. Per ID, he is not a candidate for either 

given his significant renal disease.





On 07/10/2022, he was seen on morning rounds and deemed medically stable for 

discharge. He was discharged with instructions to schedule follow-up 

appointments with his PCP in 3-5 days, his nephrologist in 7-10 days, and with 

cardiology in 1-2 weeks. He was provided prescriptions for spironolactone and 

bumetanide. He was given the opportunity to ask questions and reported no 

further questions. Furthermore, all questions were answered to the best of my 

ability.





Today, I personally spent 25 minutes with him, of which greater than 50% of the 

time was spent in patient education, counseling, and coordination of care as 

described above.








PHYSICAL EXAMINATION:


General: Alert, In no apparent distress, Oriented x3


HEENT: Atraumatic, Mucous membr. moist/pink, EOMI


Neck: Supple, Without JVD or thyroid abnormality


Respiratory: Clear to auscultation bilaterally, Normal air movement


Cardiovascular: No edema, Regular rate/rhythm, Normal S1 S2, No gallops, No 

rubs, No murmurs


Gastrointestinal: Normal bowel sounds, Soft and benign, No tenderness, No 

rebound, No guarding


Musculoskeletal: No clubbing, No swelling


Integumentary: No rashes


Neurological: Normal speech, Normal affect








Vital Signs/Physical Exam: 














Temp Pulse Resp BP Pulse Ox


 


 97.5 F   67   16   152/74 H  99 


 


 07/10/22 12:00  07/10/22 12:00  07/10/22 12:00  07/10/22 12:00  07/10/22 12:00








Laboratory Data at Discharge: 














WBC  7.6 K/uL (4.3-10.9)  D 07/10/22  04:02    


 


Hgb  11.3 g/dL (13.6-17.9)  L  07/10/22  04:02    


 


Hct  33.6 % (39.6-49.0)  L  07/10/22  04:02    


 


Plt Count  239 K/uL (152-406)   07/10/22  04:02    


 


Sodium  132 mmol/L (136-145)  L  07/10/22  04:02    


 


Potassium  5.1 mmol/L (3.5-5.1)   07/10/22  04:02    


 


BUN  50 mg/dL (7-18)  H  07/10/22  04:02    


 


Creatinine  2.66 mg/dL (0.55-1.3)  H D 07/10/22  04:02    


 


Glucose  290 mg/dL ()  H  07/10/22  04:02    


 


Total Bilirubin  0.4 mg/dL (0.2-1.0)   07/10/22  04:02    


 


AST  29 U/L (15-37)   07/10/22  04:02    


 


ALT  51 U/L (12-78)   07/10/22  04:02    


 


Alkaline Phosphatase  57 U/L ()   07/10/22  04:02    








Home Medications: 








Amlodipine [Norvasc*] 5 mg PO DAILY 07/08/22 


Aspirin [Aspirin EC 81 MG] 81 mg PO DAILY 07/08/22 


Doxazosin Mesylate [Cardura] 2 mg PO BEDTIME 07/08/22 


Famotidine 20 mg PO BID 07/08/22 


Hydralazine [Apresoline*] 25 mg PO TID 07/08/22 


Labetalol HCl [Trandate] 200 mg PO BID 07/08/22 


Simvastatin 20 mg PO BEDTIME 07/08/22 


Tadalafil [Cialis] 5 mg PO DAILY 07/08/22 


Tamsulosin [Flomax*] 1 mg PO DAILY 07/08/22 


Bumetanide [Bumex] 0.5 mg PO DAILY #30 tab 07/10/22 


Spironolactone 25 mg PO DAILY #30 tablet 07/10/22 





New Medications: 


Bumetanide [Bumex] 0.5 mg PO DAILY #30 tab


Spironolactone 25 mg PO DAILY #30 tablet


Physician Discharge Instructions: 


1. Please schedule follow-up with your PCP in 3-5 days.


2. Please schedule follow-up with your Nephrologist (Dr. Plummer) in 7-10 days.


3. Please schedule follow-up with Cardiology (Dr. Lee) in 1-2 weeks.


Diet: Renal


Activity: Ad leesa


Followup: 


Mali Gan PA [Primary Care Provider] - 


Time spent managing pt's care (in minutes): 25

## 2022-07-11 NOTE — EKG
Test Date:    2022-07-08               Test Time:    16:09:53

Technician:   KENDRA                                     

                                                     

MEASUREMENT RESULTS:                                       

Intervals:                                           

Rate:         74                                     

WV:           164                                    

QRSD:         76                                     

QT:           372                                    

QTc:          412                                    

Axis:                                                

P:            53                                     

WV:           164                                    

QRS:          -37                                    

T:            100                                    

                                                     

INTERPRETIVE STATEMENTS:                                       

                                                     

Normal sinus rhythm

Left axis deviation

Abnormal QRS-T angle, consider primary T wave abnormality

Abnormal ECG

Compared to ECG 05/01/2011 07:02:19

Possible ischemia no longer present

T-wave abnormality still present



Electronically Signed On 07-11-22 13:48:39 CDT by Thaddeus Lee